# Patient Record
Sex: MALE | Race: WHITE | NOT HISPANIC OR LATINO | Employment: FULL TIME | URBAN - METROPOLITAN AREA
[De-identification: names, ages, dates, MRNs, and addresses within clinical notes are randomized per-mention and may not be internally consistent; named-entity substitution may affect disease eponyms.]

---

## 2024-08-15 ENCOUNTER — OFFICE VISIT (OUTPATIENT)
Dept: URGENT CARE | Facility: CLINIC | Age: 44
End: 2024-08-15
Payer: COMMERCIAL

## 2024-08-15 VITALS
RESPIRATION RATE: 16 BRPM | WEIGHT: 240 LBS | DIASTOLIC BLOOD PRESSURE: 79 MMHG | HEART RATE: 90 BPM | HEIGHT: 70 IN | TEMPERATURE: 98 F | OXYGEN SATURATION: 98 % | SYSTOLIC BLOOD PRESSURE: 118 MMHG | BODY MASS INDEX: 34.36 KG/M2

## 2024-08-15 DIAGNOSIS — T80.90XA INJECTION SITE REACTION, INITIAL ENCOUNTER: Primary | ICD-10-CM

## 2024-08-15 RX ORDER — KETOROLAC TROMETHAMINE 30 MG/ML
30 INJECTION, SOLUTION INTRAMUSCULAR; INTRAVENOUS
Status: COMPLETED | OUTPATIENT
Start: 2024-08-15 | End: 2024-08-15

## 2024-08-15 RX ORDER — KETOROLAC TROMETHAMINE 10 MG/1
10 TABLET, FILM COATED ORAL EVERY 6 HOURS PRN
Qty: 20 TABLET | Refills: 0 | Status: SHIPPED | OUTPATIENT
Start: 2024-08-15 | End: 2024-08-20

## 2024-08-15 RX ADMIN — KETOROLAC TROMETHAMINE 30 MG: 30 INJECTION, SOLUTION INTRAMUSCULAR; INTRAVENOUS at 06:08

## 2024-08-15 NOTE — PATIENT INSTRUCTIONS
Please follow up with your primary care provider within 2-5 days if your signs and symptoms have not resolved or worsen.     If your condition worsens or fails to improve we recommend that you receive another evaluation at the emergency room immediately or contact your primary medical clinic to discuss your concerns.   You must understand that you have received an Urgent Care treatment only and that you may be released before all of your medical problems are known or treated. You, the patient, will arrange for follow up care as instructed.        Consider purchasing a 23 gauge needle.  Please be sure to pinch the area while giving the injection.  Please massage the area after the injection is given.  Be sure to slowly insert the needle into your skin and angle the needle 45 degrees. If you notice any increased pain, swelling or redness then please follow up in the office or go the ER. Be sure to continue icing the area. You can take the Toradol every 6 hours as needed for pain. Please call [phone] if you need anything.

## 2024-08-15 NOTE — PROGRESS NOTES
"Subjective:      Patient ID: Stefano Barber is a 44 y.o. male.    Vitals:  height is 5' 10" (1.778 m) and weight is 108.9 kg (240 lb). His temperature is 98.2 °F (36.8 °C). His blood pressure is 118/79 and his pulse is 90. His respiration is 16 and oxygen saturation is 98%.     Chief Complaint: Leg Swelling (From testo shot)    Pt states he gave himself a testosterone shot Tuesday night and the area started swelling the next day. He gave the shot in his upper thigh, the area feels tender and a little warm and it hurts to the touch. He states that he quickly inserts the needle in a upright position. He wife stated that he stabs himself with the needle and she has told him that he is not doing the injections correctly.   He uses a 22  gauge needle. He has pain with walking and when stretching his right knee.       Constitution: Negative for chills and fever.   Cardiovascular:  Negative for chest pain.   Respiratory:  Negative for shortness of breath.    Musculoskeletal:  Positive for pain and joint swelling.   Skin:  Positive for erythema.      Objective:     Physical Exam   Skin: erythema       Assessment:     1. Injection site reaction, initial encounter        Plan:       Injection site reaction, initial encounter  -     ketorolac injection 30 mg  -     ketorolac (TORADOL) 10 mg tablet; Take 1 tablet (10 mg total) by mouth every 6 (six) hours as needed for Pain.  Dispense: 20 tablet; Refill: 0      Please follow up with your primary care provider within 2-5 days if your signs and symptoms have not resolved or worsen.     If your condition worsens or fails to improve we recommend that you receive another evaluation at the emergency room immediately or contact your primary medical clinic to discuss your concerns.   You must understand that you have received an Urgent Care treatment only and that you may be released before all of your medical problems are known or treated. You, the patient, will arrange for follow " up care as instructed.        Consider purchasing a 23 gauge needle.  Please be sure to pinch the area while giving the injection.  Please massage the area after the injection is given.  Be sure to slowly insert the needle into your skin and angle the needle 45 degrees. If you notice any increased pain, swelling or redness then please follow up in the office or go the ER. Be sure to continue icing the area. You can take the Toradol every 6 hours as needed for pain. Please call [phone] if you need anything.     Medical Decision Making:   Differential Diagnosis:   Injection site reaction  Urgent Care Management:  Pt states he gave himself a testosterone shot Tuesday night and the area started swelling the next day. He gave the shot in his upper thigh, the area feels tender and a little warm and it hurts to the touch. He states that he quickly inserts the needle in a upright position. He wife stated that he stabs himself with the needle and she has told him that he is not doing the injections correctly. He uses a 22  gauge needle. He has pain with walking and when stretching his right knee.   Vitals within normal limits.  Please see the uploaded picture.  The patient was given the following instructions.  Consider purchasing a 23 gauge needle.  Please be sure to pinch the area while giving the injection.  Please massage the area after the injection is given.  Be sure to slowly insert the needle into your skin and angle the needle 45 degrees. If you notice any increased pain, swelling or redness then please follow up in the office or go the ER. Be sure to continue icing the area. You can take the Toradol every 6 hours as needed for pain. Please call [phone] if you need anything.

## 2024-12-17 ENCOUNTER — TELEPHONE (OUTPATIENT)
Dept: GASTROENTEROLOGY | Facility: CLINIC | Age: 44
End: 2024-12-17
Payer: COMMERCIAL

## 2024-12-17 NOTE — TELEPHONE ENCOUNTER
Pt stopped by the clinic and wants his medical records. KATLYN was filled out and scanned into chart. I have emailed KATLYN requesting information. Pt is a former [employer] and all records are in [facility]. [email or web address]